# Patient Record
(demographics unavailable — no encounter records)

---

## 2024-10-31 NOTE — END OF VISIT
[FreeTextEntry3] :   I, Michelle Javy, acted as a scribe on behalf of Dr. Diane Ann M.D  on 10/31/2024.   All medical entries made by the scribe were at my, Dr. Diane Ann M.D ,  direction and personally dictated by me on 10/31/2024. I have reviewed the chart and agree that the record accurately reflects my personal performance of the history, physical exam, assessment and plan. I have also personally directed, reviewed, and agreed with the chart.

## 2024-10-31 NOTE — HISTORY OF PRESENT ILLNESS
[FreeTextEntry1] : 71 year  old female   presents for annual visit. Last seen 2023 w/ neg pap/hpv. Last mammo Oct 2023.    ob hx:  x2 PMH: osteo arthritis PSHx: joint replacement FMH: diabetes, hypertension- mother Allergies: nsaids , pnc

## 2024-11-12 NOTE — HISTORY OF PRESENT ILLNESS
[de-identified] : 11/12/24: Right TKA 2 years postop min pain.  Occasional episodes of left knee pain but not bad.   Prev doc: 7/15/24: 71 F with left knee pain x 3 days, no injury.  Prior left TKA 2016 by Dr. Vargas which needed postop PEARL and did well after that.  Right TKA by me 2022 no issues, no pain.  Had fever yesterday 100.7 which went away with tylenol.  No other systemic symptoms.  8/2/24: Here to f/up LT knee. After aspiration at last visit and reviewing results- positive alpha defensin, high cell count, negative PCR and joint culture- told pt to go to ER for further testing to r/o infection- labs came back normal- ESR 28. Has been taking Meloxicam once a day- reports feeling better, no fever/chills. Ambulates without assistance. Occasionally uses brace. [FreeTextEntry5] : 2 year follow up on left knee, No pain. Right knee worsening.  [de-identified] : doing pt

## 2024-11-12 NOTE — IMAGING
[Bilateral] : knee bilaterally [AP] : anteroposterior [Lateral] : lateral [[place]] : skyline [Components well fixed, in good position] : Components well fixed, in good position [de-identified] : Left knee: Healed incision.  Min effusion.  No warmth.  ROM 3-95, guarded.  NVI.  Walks without assistance.  Right knee: Inc healed.  No effusion.  ROM 3-105.  No tenderness.  Lig stable.

## 2024-11-12 NOTE — DISCUSSION/SUMMARY
[de-identified] : The patient was advised of the diagnosis.  The natural history of the pathology was explained in full to the patient in layman's terms. All questions were answered.  The risks and benefits of surgical and non-surgical treatment alternatives were explained in full to the patient.  I saw the patient under the supervision of Dr. Graves and followed his plan of care.

## 2024-11-12 NOTE — ASSESSMENT
[FreeTextEntry1] : Previous doc: 7/15/24: Acute effusion in left TKA 2016 with fever yesterday.  Asp today r/o infection, brace, mobic, reeval in 2 weeks. 8/2/24: Pt reports feeling much better- no f/c/s and having min pain. Will cont to monitor for now but discussed if symptoms come back may need to operate. Discussed if she does have an infection that would require surgical intervention ASAP. F/up 3 months or sooner if symptoms recur for repeat XR and re eval- if she has an effusion will aspirate and send to labs. Discussed abx prophylaxis prior to dentist. Also given PT Rx for b/l knees.   11/12/24: Right TKA 2 years postop doing well.  Left knee occasional episodes of pain, no clinical signs of infection.  Declined asp today and will reeval in 3 months.  PT for both knees - she was unable to do this after last visit and would like to work on her stiffness.